# Patient Record
(demographics unavailable — no encounter records)

---

## 2024-10-31 NOTE — REVIEW OF SYSTEMS
[Negative] : Heme/Lymph [Chest Pain] : no chest pain [Palpitations] : palpitations [Orthopnea] : no orthopnea [Suicidal] : not suicidal [Insomnia] : no insomnia [Anxiety] : anxiety [Depression] : depression

## 2024-10-31 NOTE — HISTORY OF PRESENT ILLNESS
[de-identified] : 46 years old female has past medical history of goiter nodules, hypercholesterolemia, and right breast lump, called back for follow up.  Thyroid sonogram showed left lobe nodule, 1.2 cm, annual follow up with thyroid sonogram recommended.  Mammogram showed right breast benign cyst. Annual screening recommended.  Both results were reviewed with pt today. Pt has anxiety problems, every time she worried somethings, her heart rate went up. Pt wants to do EKG. No chest pain.

## 2024-10-31 NOTE — HISTORY OF PRESENT ILLNESS
[de-identified] : 46 years old female has past medical history of goiter nodules, hypercholesterolemia, and right breast lump, called back for follow up.  Thyroid sonogram showed left lobe nodule, 1.2 cm, annual follow up with thyroid sonogram recommended.  Mammogram showed right breast benign cyst. Annual screening recommended.  Both results were reviewed with pt today. Pt has anxiety problems, every time she worried somethings, her heart rate went up. Pt wants to do EKG. No chest pain.

## 2024-10-31 NOTE — PHYSICAL EXAM
[Normal] : no joint swelling and grossly normal strength and tone [de-identified] : Anxious looking, good eye to eye contact and engaged conversation well.

## 2024-10-31 NOTE — PHYSICAL EXAM
[Normal] : no joint swelling and grossly normal strength and tone [de-identified] : Anxious looking, good eye to eye contact and engaged conversation well.

## 2024-11-14 NOTE — HISTORY OF PRESENT ILLNESS
[de-identified] : 46 years old female has past medical history of goiter nodules, hypercholesterolemia, and right breast lump, called back to review her most recent test results. Fasting glucose was 102, HBA1c 5.0, UA positive for trace blood. Reports were reviewed with pt in details. Other blood tests came back wnl.

## 2024-11-14 NOTE — HISTORY OF PRESENT ILLNESS
[de-identified] : 46 years old female has past medical history of goiter nodules, hypercholesterolemia, and right breast lump, called back to review her most recent test results. Fasting glucose was 102, HBA1c 5.0, UA positive for trace blood. Reports were reviewed with pt in details. Other blood tests came back wnl.

## 2025-01-24 NOTE — HISTORY OF PRESENT ILLNESS
[FreeTextEntry8] : 46 years old female has past medical history of goiter nodules, hypercholesterolemia, and right breast lump, called to office for acute consultation. LMP was in 12/28/24, regular and lasted for 5 days. Pt started having spotting in 1/11/25, heavier sometimes. Pt went to see her GYN, had urine test and transvaginal pelvic sonogram in the office. GYN didn't share pelvic sonogram results with pt, recommended endometrial biopsy today. Pt got scared and came to pcp for advise. This is the first episode for pt to have these symptoms.

## 2025-07-17 NOTE — HISTORY OF PRESENT ILLNESS
[de-identified] : 46 years old female with past medical history of goiter nodules, hypercholesterolemia, and right breast cyst came back for annual physical exam.

## 2025-07-17 NOTE — HEALTH RISK ASSESSMENT
[Good] : ~his/her~  mood as  good [No] : In the past 12 months have you used drugs other than those required for medical reasons? No [Several Days (1)] : 8.) Moving or speaking so slowly that other people could have noticed, or the opposite, moving or speaking faster than usual? Several days [Not at All (0)] : 9.) Thoughts that you would be off dead or of hurting yourself in some way? Not at all [PHQ-9 Positive] : PHQ-9 Positive [I have developed a follow-up plan documented below in the note.] : I have developed a follow-up plan documented below in the note. [Yes] : Reviewed medication list for presence of high-risk medications. [Opioids] : opioids [Never] : Never [Patient reported mammogram was normal] : Patient reported mammogram was normal [Patient reported PAP Smear was normal] : Patient reported PAP Smear was normal [Patient declined colonoscopy] : Patient declined colonoscopy [de-identified] : socially [HIO0Opcyl] : 4 [MammogramDate] : 8/24 [PapSmearDate] : 1/23

## 2025-07-17 NOTE — HISTORY OF PRESENT ILLNESS
[de-identified] : 46 years old female with past medical history of goiter nodules, hypercholesterolemia, and right breast cyst came back for annual physical exam.

## 2025-07-17 NOTE — HEALTH RISK ASSESSMENT
[Good] : ~his/her~  mood as  good [No] : In the past 12 months have you used drugs other than those required for medical reasons? No [Several Days (1)] : 8.) Moving or speaking so slowly that other people could have noticed, or the opposite, moving or speaking faster than usual? Several days [Not at All (0)] : 9.) Thoughts that you would be off dead or of hurting yourself in some way? Not at all [PHQ-9 Positive] : PHQ-9 Positive [I have developed a follow-up plan documented below in the note.] : I have developed a follow-up plan documented below in the note. [Yes] : Reviewed medication list for presence of high-risk medications. [Opioids] : opioids [Never] : Never [Patient reported mammogram was normal] : Patient reported mammogram was normal [Patient reported PAP Smear was normal] : Patient reported PAP Smear was normal [Patient declined colonoscopy] : Patient declined colonoscopy [de-identified] : socially [[id number]] : 4 [MammogramDate] : 8/24 [PapSmearDate] : 1/23